# Patient Record
Sex: MALE | Race: WHITE | NOT HISPANIC OR LATINO | Employment: FULL TIME | URBAN - METROPOLITAN AREA
[De-identification: names, ages, dates, MRNs, and addresses within clinical notes are randomized per-mention and may not be internally consistent; named-entity substitution may affect disease eponyms.]

---

## 2017-05-24 ENCOUNTER — OFFICE VISIT (OUTPATIENT)
Dept: URGENT CARE | Facility: PHYSICIAN GROUP | Age: 57
End: 2017-05-24
Payer: COMMERCIAL

## 2017-05-24 VITALS
SYSTOLIC BLOOD PRESSURE: 130 MMHG | WEIGHT: 230 LBS | TEMPERATURE: 97.5 F | HEART RATE: 88 BPM | BODY MASS INDEX: 34.07 KG/M2 | DIASTOLIC BLOOD PRESSURE: 78 MMHG | OXYGEN SATURATION: 97 % | HEIGHT: 69 IN

## 2017-05-24 DIAGNOSIS — J01.00 ACUTE NON-RECURRENT MAXILLARY SINUSITIS: ICD-10-CM

## 2017-05-24 LAB
INT CON NEG: NEGATIVE
INT CON POS: POSITIVE
S PYO AG THROAT QL: NORMAL

## 2017-05-24 PROCEDURE — 87880 STREP A ASSAY W/OPTIC: CPT | Performed by: FAMILY MEDICINE

## 2017-05-24 PROCEDURE — 99214 OFFICE O/P EST MOD 30 MIN: CPT | Performed by: FAMILY MEDICINE

## 2017-05-24 RX ORDER — AMOXICILLIN AND CLAVULANATE POTASSIUM 562.5; 437.5; 62.5 MG/1; MG/1; MG/1
2 TABLET, MULTILAYER, EXTENDED RELEASE ORAL 2 TIMES DAILY
Qty: 28 TAB | Refills: 0 | Status: SHIPPED | OUTPATIENT
Start: 2017-05-24 | End: 2017-05-24 | Stop reason: CLARIF

## 2017-05-24 RX ORDER — MOMETASONE FUROATE 50 UG/1
2 SPRAY, METERED NASAL DAILY
Qty: 1 INHALER | Refills: 0 | Status: SHIPPED | OUTPATIENT
Start: 2017-05-24

## 2017-05-24 RX ORDER — AMOXICILLIN AND CLAVULANATE POTASSIUM 562.5; 437.5; 62.5 MG/1; MG/1; MG/1
2 TABLET, MULTILAYER, EXTENDED RELEASE ORAL 2 TIMES DAILY
Qty: 28 TAB | Refills: 0 | Status: SHIPPED | OUTPATIENT
Start: 2017-05-24 | End: 2017-05-31

## 2017-05-24 RX ORDER — DEXTROAMPHETAMINE SACCHARATE, AMPHETAMINE ASPARTATE MONOHYDRATE, DEXTROAMPHETAMINE SULFATE AND AMPHETAMINE SULFATE 2.5; 2.5; 2.5; 2.5 MG/1; MG/1; MG/1; MG/1
10 CAPSULE, EXTENDED RELEASE ORAL EVERY MORNING
COMMUNITY

## 2017-05-24 NOTE — MR AVS SNAPSHOT
"        Jemal Dennis   2017 8:25 AM   Office Visit   MRN: [id number]    Department:  [facility]   Dept Phone:  [phone]    Description:  Male : 1960   Provider:  Mika Nair M.D.           Reason for Visit     Pharyngitis x 3 days, x 1 week cough       Allergies as of 2017     No Known Allergies      You were diagnosed with     Acute non-recurrent maxillary sinusitis   [[id number]]         Vital Signs     Blood Pressure Pulse Temperature Height Weight Body Mass Index    130/78 mmHg 88 36.4 °C (97.5 °F) 1.753 m (5' 9\") 104.327 kg (230 lb) 33.95 kg/m2    Oxygen Saturation Smoking Status                97% Never Smoker           Basic Information     Date Of Birth Sex Race Ethnicity Preferred Language    1960 Male White Non- English      Health Maintenance        Date Due Completion Dates    IMM DTaP/Tdap/Td Vaccine (1 - Tdap) 1979 ---    COLONOSCOPY 2010 ---            Results     POCT Rapid Strep A      Component    Rapid Strep Screen    NEG    Internal Control Positive    Positive    Internal Control Negative    Negative                        Current Immunizations     No immunizations on file.      Below and/or attached are the medications your provider expects you to take. Review all of your home medications and newly ordered medications with your provider and/or pharmacist. Follow medication instructions as directed by your provider and/or pharmacist. Please keep your medication list with you and share with your provider. Update the information when medications are discontinued, doses are changed, or new medications (including over-the-counter products) are added; and carry medication information at all times in the event of emergency situations     Allergies:  No Known Allergies          Medications  Valid as of: May 24, 2017 -  6:06 PM    Generic Name Brand Name Tablet Size Instructions for use    Albuterol Sulfate (Aero Soln) VENTOLIN  (90 " BASE) MCG/ACT Inhale 2 Puffs by mouth every 6 hours as needed for Shortness of Breath. Please include metered dose inhaler chamber/spacer device and illustrate useage        Amoxicillin-Pot Clavulanate (TABLET SR 12 HR) AUGMENTIN SR 1000-62.5 MG Take 2 Tabs by mouth 2 times a day for 7 days.        Amphetamine-Dextroamphetamine (CAPSULE SR 24 HR) ADDERALL XR 10 MG Take 10 mg by mouth every morning.        Hydrocod Polst-Chlorphen Polst (Liquid CR) TUSSIONEX 10-8 MG/5ML Take 5 mL by mouth every 12 hours as needed for Cough or Rhinitis. No driving or operation of machinery after taking this medication. Do not exceed 10 mL in a 24-hour period.        Mometasone Furoate (Suspension) NASONEX 50 MCG/ACT Spray 2 Sprays in nose every day.        Mometasone Furoate (Suspension) NASONEX 50 MCG/ACT Spray 2 Sprays in nose every day.        Pseudoephedrine HCl   Take  by mouth.        Rivaroxaban   Take  by mouth.        .                 Medicines prescribed today were sent to:     [id number] - [place], [place] - [id number] [facility] [address]    Phone: [phone] Fax: [phone]    Open 24 Hours?: No      Medication refill instructions:       If your prescription bottle indicates you have medication refills left, it is not necessary to call your provider’s office. Please contact your pharmacy and they will refill your medication.    If your prescription bottle indicates you do not have any refills left, you may request refills at any time through one of the following ways: The online combionic system (except Urgent Care), by calling your provider’s office, or by asking your pharmacy to contact your provider’s office with a refill request. Medication refills are processed only during regular business hours and may not be available until the next business day. Your provider may request additional information or to have a follow-up visit with you prior to refilling your medication.   *Please Note:  Medication refills are assigned a new Rx number when refilled electronically. Your pharmacy may indicate that no refills were authorized even though a new prescription for the same medication is available at the pharmacy. Please request the medicine by name with the pharmacy before contacting your provider for a refill.           SeeClickFix Access Code: [email or web address]  Expires: 6/23/2017  8:27 AM    SeeClickFix  A secure, online tool to manage your health information     [employer]’s SeeClickFix® is a secure, online tool that connects you to your personalized health information from the privacy of your home -- day or night - making it very easy for you to manage your healthcare. Once the activation process is completed, you can even access your medical information using the SeeClickFix ag, which is available for free in the Apple Ag store or Google Play store.     SeeClickFix provides the following levels of access (as shown below):   My Chart Features   [facility] Doctor [facility]  Specialists [facility]  [facility]  Primary Care  Doctor   Email your healthcare team securely and privately 24/7 X X X    Manage appointments: schedule your next appointment; view details of past/upcoming appointments X      Request prescription refills. X      View recent personal medical records, including lab and immunizations X X X X   View health record, including health history, allergies, medications X X X X   Read reports about your outpatient visits, procedures, consult and ER notes X X X X   See your discharge summary, which is a recap of your hospital and/or ER visit that includes your diagnosis, lab results, and care plan. X X       How to register for SeeClickFix:  1. Go to  [email or web address].  2. Click on the Sign Up Now box, which takes you to the New Member Sign Up page. You will need to provide the following information:  a. Enter your SeeClickFix Access Code exactly as it appears at the top of this page. (You will  not need to use this code after you’ve completed the sign-up process. If you do not sign up before the expiration date, you must request a new code.)   b. Enter your date of birth.   c. Enter your home email address.   d. Click Submit, and follow the next screen’s instructions.  3. Create a deltaDNAt ID. This will be your deltaDNAt login ID and cannot be changed, so think of one that is secure and easy to remember.  4. Create a [employer] password. You can change your password at any time.  5. Enter your Password Reset Question and Answer. This can be used at a later time if you forget your password.   6. Enter your e-mail address. This allows you to receive e-mail notifications when new information is available in [employer].  7. Click Sign Up. You can now view your health information.    For assistance activating your [employer] account, call [phone]

## 2018-12-04 ENCOUNTER — OFFICE VISIT (OUTPATIENT)
Dept: URGENT CARE | Facility: PHYSICIAN GROUP | Age: 58
End: 2018-12-04
Payer: COMMERCIAL

## 2018-12-04 VITALS
OXYGEN SATURATION: 96 % | SYSTOLIC BLOOD PRESSURE: 144 MMHG | WEIGHT: 228 LBS | BODY MASS INDEX: 33.77 KG/M2 | TEMPERATURE: 98.2 F | RESPIRATION RATE: 16 BRPM | HEIGHT: 69 IN | DIASTOLIC BLOOD PRESSURE: 90 MMHG | HEART RATE: 91 BPM

## 2018-12-04 DIAGNOSIS — R25.2 SPASM: ICD-10-CM

## 2018-12-04 DIAGNOSIS — S39.012A STRAIN OF LUMBAR REGION, INITIAL ENCOUNTER: ICD-10-CM

## 2018-12-04 PROCEDURE — 99214 OFFICE O/P EST MOD 30 MIN: CPT | Performed by: FAMILY MEDICINE

## 2018-12-04 RX ORDER — CYCLOBENZAPRINE HCL 10 MG
10 TABLET ORAL 3 TIMES DAILY PRN
Qty: 20 TAB | Refills: 0 | Status: SHIPPED | OUTPATIENT
Start: 2018-12-04

## 2018-12-04 ASSESSMENT — ENCOUNTER SYMPTOMS
SENSORY CHANGE: 0
NECK PAIN: 0
EYE DISCHARGE: 0
FOCAL WEAKNESS: 0
FLANK PAIN: 0
WEIGHT LOSS: 0
EYE REDNESS: 0
MYALGIAS: 0

## 2018-12-04 NOTE — PROGRESS NOTES
"Subjective:      Jemal Dennis is a 57 y.o. male who presents with Low Back Pain (x3days)            3 days low back pain probably triggered by pulling dog into shower to bathe her. Pain is worse when standing from seated position and walking. 8/10. No radiation. No myelopathy. No PMH cancer or unwanted weight loss. No trauma. OTC tylenol without much relief. No other aggravating or alleviating factors.          Review of Systems   Constitutional: Negative for malaise/fatigue and weight loss.   Eyes: Negative for discharge and redness.   Genitourinary: Negative for flank pain and hematuria.   Musculoskeletal: Negative for joint pain, myalgias and neck pain.   Skin: Negative for itching and rash.   Neurological: Negative for sensory change and focal weakness.   .  Medications, Allergies, and current problem list reviewed today in Epic         Objective:     /90   Pulse 91   Temp 36.8 °C (98.2 °F) (Temporal)   Resp 16   Ht 1.753 m (5' 9\")   Wt 103.4 kg (228 lb)   SpO2 96%   BMI 33.67 kg/m²      Physical Exam   Constitutional: He is oriented to person, place, and time. He appears well-developed and well-nourished. No distress.   HENT:   Head: Normocephalic and atraumatic.   Eyes: Conjunctivae are normal.   Neck: Neck supple.   Musculoskeletal:        Back:    Lymphadenopathy:     He has no cervical adenopathy.   Neurological: He is alert and oriented to person, place, and time. He displays normal reflexes.   Bilateral lower extremity strength and sensory intact.  Negative straight leg raise.     Skin: Skin is warm and dry. No rash noted.               Assessment/Plan:     1. Strain of lumbar region, initial encounter     2. Spasm  cyclobenzaprine (FLEXERIL) 10 MG Tab     Differential diagnosis, natural history, supportive care, and indications for immediate follow-up discussed at length.     "

## 2018-12-04 NOTE — LETTER
December 4, 2018         Patient: Jemal Dennis   YOB: 1960   Date of Visit: 12/4/2018           To Whom it May Concern:    Jemal Dennis was seen in my clinic on 12/4/2018. Please excuse 12/3 through 12/6/2018. May return to next scheduled shift full duty without restrictions if symptoms have resolved.       Sincerely,           Bacilio Bedoya M.D.  Electronically Signed

## 2019-11-29 ENCOUNTER — OFFICE VISIT (OUTPATIENT)
Dept: URGENT CARE | Facility: PHYSICIAN GROUP | Age: 59
End: 2019-11-29
Payer: COMMERCIAL

## 2019-11-29 VITALS
TEMPERATURE: 97.4 F | BODY MASS INDEX: 28.88 KG/M2 | HEART RATE: 98 BPM | OXYGEN SATURATION: 91 % | DIASTOLIC BLOOD PRESSURE: 70 MMHG | SYSTOLIC BLOOD PRESSURE: 122 MMHG | WEIGHT: 195 LBS | HEIGHT: 69 IN

## 2019-11-29 DIAGNOSIS — J01.40 ACUTE PANSINUSITIS, RECURRENCE NOT SPECIFIED: ICD-10-CM

## 2019-11-29 PROCEDURE — 99214 OFFICE O/P EST MOD 30 MIN: CPT | Performed by: PHYSICIAN ASSISTANT

## 2019-11-29 RX ORDER — AMOXICILLIN AND CLAVULANATE POTASSIUM 875; 125 MG/1; MG/1
1 TABLET, FILM COATED ORAL 2 TIMES DAILY
Qty: 10 TAB | Refills: 0 | Status: SHIPPED | OUTPATIENT
Start: 2019-11-29 | End: 2019-12-04

## 2019-11-29 RX ORDER — FLUTICASONE PROPIONATE 50 MCG
1 SPRAY, SUSPENSION (ML) NASAL DAILY
Qty: 1 BOTTLE | Refills: 0 | Status: SHIPPED | OUTPATIENT
Start: 2019-11-29

## 2019-11-29 ASSESSMENT — ENCOUNTER SYMPTOMS
MYALGIAS: 0
EYE REDNESS: 0
EYE DISCHARGE: 0
SINUS PRESSURE: 1
CHILLS: 0
DIARRHEA: 0
SORE THROAT: 1
CONSTIPATION: 0
SPUTUM PRODUCTION: 0
FEVER: 0
ABDOMINAL PAIN: 0
SHORTNESS OF BREATH: 0
SINUS PAIN: 1
NAUSEA: 0
DIZZINESS: 0
VOMITING: 0
HEADACHES: 1
WHEEZING: 0
PALPITATIONS: 0
COUGH: 1

## 2019-11-29 NOTE — PROGRESS NOTES
Subjective:      Jemal Dennis is a 58 y.o. male who presents with Cough (Ears muffled ) and Congestion      Sinusitis   This is a new problem. The current episode started 1 to 4 weeks ago (Symptoms started almost 2 weeks ago). The problem is unchanged. There has been no fever. The pain is moderate. Associated symptoms include congestion, coughing, ear pain, headaches, sinus pressure, sneezing and a sore throat. Pertinent negatives include no chills or shortness of breath. Past treatments include oral decongestants, saline sprays and acetaminophen. The treatment provided mild relief.       Review of Systems   Constitutional: Positive for malaise/fatigue. Negative for chills and fever.   HENT: Positive for congestion, ear pain, sinus pressure, sinus pain, sneezing and sore throat.    Eyes: Negative for discharge and redness.   Respiratory: Positive for cough. Negative for sputum production, shortness of breath and wheezing.    Cardiovascular: Negative for chest pain and palpitations.   Gastrointestinal: Negative for abdominal pain, constipation, diarrhea, nausea and vomiting.   Musculoskeletal: Negative for myalgias.   Skin: Negative for rash.   Neurological: Positive for headaches. Negative for dizziness.       PMH:  has no past medical history on file.  MEDS:   Current Outpatient Medications:   •  amoxicillin-clavulanate (AUGMENTIN) 875-125 MG Tab, Take 1 Tab by mouth 2 times a day for 5 days., Disp: 10 Tab, Rfl: 0  •  fluticasone (FLONASE) 50 MCG/ACT nasal spray, Spray 1 Spray in nose every day., Disp: 1 Bottle, Rfl: 0  •  Rivaroxaban (XARELTO PO), Take  by mouth., Disp: , Rfl:   •  cyclobenzaprine (FLEXERIL) 10 MG Tab, Take 1 Tab by mouth 3 times a day as needed for Muscle Spasms. (Patient not taking: Reported on 11/29/2019), Disp: 20 Tab, Rfl: 0  •  amphetamine-dextroamphetamine XR (ADDERALL XR, 10MG,) 10 MG CAPSULE SR 24 HR, Take 10 mg by mouth every morning., Disp: , Rfl:   •  mometasone (NASONEX) 50  "MCG/ACT nasal spray, Spray 2 Sprays in nose every day. (Patient not taking: Reported on 12/4/2018), Disp: 1 Inhaler, Rfl: 0  •  mometasone (NASONEX) 50 MCG/ACT nasal spray, Spray 2 Sprays in nose every day. (Patient not taking: Reported on 11/29/2019), Disp: 1 Inhaler, Rfl: 0  •  Pseudoephedrine HCl (DECONGESTANT PO), Take  by mouth., Disp: , Rfl:   •  VENTOLIN  (90 BASE) MCG/ACT AERS, Inhale 2 Puffs by mouth every 6 hours as needed for Shortness of Breath. Please include metered dose inhaler chamber/spacer device and illustrate useage (Patient not taking: Reported on 11/29/2019), Disp: 1 Inhaler, Rfl: 0  •  hydrocod polst-chlorphen polst (TUSSIONEX PENNKINETIC ER) 10-8 MG/5ML LQCR, Take 5 mL by mouth every 12 hours as needed for Cough or Rhinitis. No driving or operation of machinery after taking this medication. Do not exceed 10 mL in a 24-hour period. (Patient not taking: Reported on 11/29/2019), Disp: 75 mL, Rfl: 0  ALLERGIES: No Known Allergies  SURGHX: History reviewed. No pertinent surgical history.  SOCHX:  reports that he has never smoked. He has never used smokeless tobacco. He reports that he does not drink alcohol.  FH: Family history was reviewed, no pertinent findings to report     Objective:     /70 (BP Location: Left arm, Patient Position: Sitting)   Pulse 98   Temp 36.3 °C (97.4 °F)   Ht 1.753 m (5' 9\")   Wt 88.5 kg (195 lb)   SpO2 91%   BMI 28.80 kg/m²      Physical Exam  Constitutional:       Appearance: He is well-developed.   HENT:      Head: Normocephalic and atraumatic.      Right Ear: Ear canal and external ear normal. A middle ear effusion is present.      Left Ear: External ear normal. A middle ear effusion is present.      Nose: Mucosal edema and congestion present.      Right Sinus: Maxillary sinus tenderness and frontal sinus tenderness present.      Left Sinus: Maxillary sinus tenderness and frontal sinus tenderness present.      Mouth/Throat:      Lips: Pink.      " Mouth: Mucous membranes are moist.      Pharynx: Oropharynx is clear.   Eyes:      Conjunctiva/sclera: Conjunctivae normal.      Pupils: Pupils are equal, round, and reactive to light.   Neck:      Musculoskeletal: Normal range of motion.   Cardiovascular:      Rate and Rhythm: Normal rate and regular rhythm.      Heart sounds: Normal heart sounds. No murmur.   Pulmonary:      Effort: Pulmonary effort is normal.      Breath sounds: Normal breath sounds. No wheezing.   Lymphadenopathy:      Cervical: No cervical adenopathy.   Skin:     General: Skin is warm and dry.      Capillary Refill: Capillary refill takes less than 2 seconds.   Neurological:      Mental Status: He is alert and oriented to person, place, and time.   Psychiatric:         Behavior: Behavior normal.         Judgment: Judgment normal.            Assessment/Plan:       1. Acute pansinusitis, recurrence not specified  - amoxicillin-clavulanate (AUGMENTIN) 875-125 MG Tab; Take 1 Tab by mouth 2 times a day for 5 days.  Dispense: 10 Tab; Refill: 0  - fluticasone (FLONASE) 50 MCG/ACT nasal spray; Spray 1 Spray in nose every day.  Dispense: 1 Bottle; Refill: 0          Differential Diagnosis, natural history, and supportive care discussed. Return to the Urgent Care or follow up with your PCP if symptoms fail to resolve, or for any new or worsening symptoms. Emergency room precautions discussed. Patient and/or family appears understanding of information.

## 2020-06-12 ENCOUNTER — OFFICE VISIT (OUTPATIENT)
Dept: URGENT CARE | Facility: PHYSICIAN GROUP | Age: 60
End: 2020-06-12
Payer: COMMERCIAL

## 2020-06-12 VITALS
TEMPERATURE: 97.2 F | SYSTOLIC BLOOD PRESSURE: 120 MMHG | RESPIRATION RATE: 16 BRPM | WEIGHT: 205 LBS | HEIGHT: 69 IN | BODY MASS INDEX: 30.36 KG/M2 | OXYGEN SATURATION: 98 % | DIASTOLIC BLOOD PRESSURE: 84 MMHG | HEART RATE: 72 BPM

## 2020-06-12 DIAGNOSIS — S05.90XA EYE INJURY, UNSPECIFIED LATERALITY, INITIAL ENCOUNTER: ICD-10-CM

## 2020-06-12 PROCEDURE — 99213 OFFICE O/P EST LOW 20 MIN: CPT | Performed by: NURSE PRACTITIONER

## 2020-06-12 SDOH — HEALTH STABILITY: MENTAL HEALTH: HOW OFTEN DO YOU HAVE A DRINK CONTAINING ALCOHOL?: MONTHLY OR LESS

## 2020-06-12 ASSESSMENT — ENCOUNTER SYMPTOMS
FEVER: 0
CHILLS: 0

## 2020-06-12 NOTE — PROGRESS NOTES
Subjective:      Jemal Dennis is a 59 y.o. male who presents with Foreign Body in Eye (L EYE,  poss metal in eye x 1day)    History reviewed. No pertinent past medical history.  Social History     Socioeconomic History   • Marital status: Single     Spouse name: Not on file   • Number of children: Not on file   • Years of education: Not on file   • Highest education level: Not on file   Occupational History   • Not on file   Social Needs   • Financial resource strain: Not on file   • Food insecurity     Worry: Not on file     Inability: Not on file   • Transportation needs     Medical: Not on file     Non-medical: Not on file   Tobacco Use   • Smoking status: Never Smoker   • Smokeless tobacco: Never Used   Substance and Sexual Activity   • Alcohol use: Yes     Frequency: Monthly or less   • Drug use: Not on file   • Sexual activity: Not on file   Lifestyle   • Physical activity     Days per week: Not on file     Minutes per session: Not on file   • Stress: Not on file   Relationships   • Social connections     Talks on phone: Not on file     Gets together: Not on file     Attends Sikh service: Not on file     Active member of club or organization: Not on file     Attends meetings of clubs or organizations: Not on file     Relationship status: Not on file   • Intimate partner violence     Fear of current or ex partner: Not on file     Emotionally abused: Not on file     Physically abused: Not on file     Forced sexual activity: Not on file   Other Topics Concern   • Not on file   Social History Narrative   • Not on file     History reviewed. No pertinent family history.    Allergies: Patient has no known allergies.    Patient is a 59-year-old male who presents today with complaint of pain and foreign body sensation to the left eye.  Patient states he was grinding metal yesterday at home while working on a truck and got a piece of metal in his eye.  He attempted to flush several times without success.   "States he continues to have discomfort.  Denies any light sensitivity, or decreased vision.  No headache or nausea.        Other   This is a new problem. The current episode started yesterday. The problem occurs constantly. The problem has been unchanged. Pertinent negatives include no chills or fever. Nothing aggravates the symptoms. He has tried nothing for the symptoms. The treatment provided no relief.       Review of Systems   Constitutional: Negative for chills and fever.   Eyes:        Left eye pain   All other systems reviewed and are negative.         Objective:     /84   Pulse 72   Temp 36.2 °C (97.2 °F) (Temporal)   Resp 16   Ht 1.753 m (5' 9\")   Wt 93 kg (205 lb)   SpO2 98%   BMI 30.27 kg/m²      Physical Exam  Vitals signs reviewed.   Constitutional:       Appearance: Normal appearance.   Neurological:      Mental Status: He is alert.       Left eye was anesthetized with proparacaine and then stained with fluorescein.  On exam with Hoover lamp, there appears to be a very subtle lucency at the 6 o'clock position.  I do not find this area with using a plain light.  I do not see an obvious rust ring at this time.  No obvious foreign object under the upper or lower lid.  No other abrasions or stain uptake.  Due to the fact that patient felt something go into his eye and was working with metal, I am concerned for the possibility of an embedded or penetrating foreign object.  I discussed this with the patient and advised him that I would like for him to be seen by specialist today as I believe he needs a higher level of care than I can reasonably and safely provide here in the urgent care.  Patient understands and verbalized agreement.  We did contact [facility] in [place] and they will see the patient at 1245 today.  Referral was placed for insurance purposes            Assessment/Plan:       1. Eye injury, unspecified laterality, initial encounter  2. Penetrating FB to the left " eye    Referral to optomettry  Patient will see [facility] today

## 2022-04-30 ENCOUNTER — OFFICE VISIT (OUTPATIENT)
Dept: URGENT CARE | Facility: PHYSICIAN GROUP | Age: 62
End: 2022-04-30
Payer: COMMERCIAL

## 2022-04-30 VITALS
HEIGHT: 69 IN | BODY MASS INDEX: 32.88 KG/M2 | TEMPERATURE: 98.2 F | WEIGHT: 222 LBS | RESPIRATION RATE: 16 BRPM | HEART RATE: 83 BPM | DIASTOLIC BLOOD PRESSURE: 82 MMHG | OXYGEN SATURATION: 96 % | SYSTOLIC BLOOD PRESSURE: 122 MMHG

## 2022-04-30 DIAGNOSIS — J02.9 SORE THROAT: ICD-10-CM

## 2022-04-30 DIAGNOSIS — J06.9 UPPER RESPIRATORY TRACT INFECTION, UNSPECIFIED TYPE: ICD-10-CM

## 2022-04-30 DIAGNOSIS — R05.9 COUGH: ICD-10-CM

## 2022-04-30 LAB
INT CON NEG: NORMAL
INT CON POS: NORMAL
S PYO AG THROAT QL: NEGATIVE

## 2022-04-30 PROCEDURE — 99213 OFFICE O/P EST LOW 20 MIN: CPT | Performed by: PHYSICIAN ASSISTANT

## 2022-04-30 PROCEDURE — 87880 STREP A ASSAY W/OPTIC: CPT | Performed by: PHYSICIAN ASSISTANT

## 2022-04-30 RX ORDER — RIVAROXABAN 20 MG/1
20 TABLET, FILM COATED ORAL EVERY EVENING
COMMUNITY
Start: 2022-04-01

## 2022-05-01 NOTE — PROGRESS NOTES
"Subjective:   Jemal Dennis is a 61 y.o. male who presents for Laryngitis, Pharyngitis (/), and Cough  Patient presents with chief complaint of mild sore throat and cough associated with nasal congestion.  Granddaughter had croup, son also sick.  Started with a scratchy throat Wednesday, mild.  Awoke the next morning with cough.  Progressed.  No fever.  Did feel mild chill.  No SOB.  Nasal congestion.  No difficulty swallowing.  Nyquil and dayquil have been helpful.  Does use Flonase and decongestant as needed.    Medications:  amphetamine-dextroamphetamine XR Cp24  cyclobenzaprine Tabs  DECONGESTANT PO  fluticasone  hydrocod polst-chlorphen polst Lqcr  mometasone  Ventolin HFA Aers  XARELTO PO  Xarelto Tabs    Allergies:             Patient has no known allergies.    Surgical History:       No past surgical history on file.    Past Social Hx:  Jemal Dennis  reports that he has never smoked. He has never used smokeless tobacco. He reports current alcohol use.     Past Family Hx:   Jemal Dennis family history is not on file.       Problem list, medications, and allergies reviewed by myself today in Epic.     Objective:     /82   Pulse 83   Temp 36.8 °C (98.2 °F) (Temporal)   Resp 16   Ht 1.753 m (5' 9\")   Wt 101 kg (222 lb)   SpO2 96%   BMI 32.78 kg/m²     Physical Exam  Vitals reviewed.   Constitutional:       General: He is not in acute distress.     Appearance: He is well-developed. He is not diaphoretic.   HENT:      Head: Normocephalic.      Mouth/Throat:      Pharynx: Posterior oropharyngeal erythema present. No oropharyngeal exudate.      Tonsils: No tonsillar exudate.   Eyes:      Conjunctiva/sclera: Conjunctivae normal.      Pupils: Pupils are equal, round, and reactive to light.   Cardiovascular:      Rate and Rhythm: Normal rate and regular rhythm.      Pulses: Normal pulses.      Heart sounds: Normal heart sounds. No murmur heard.  Pulmonary:      Effort: No respiratory " distress.      Breath sounds: Normal breath sounds. No stridor. No wheezing or rhonchi.   Musculoskeletal:         General: Normal range of motion.      Cervical back: Normal range of motion and neck supple.   Skin:     General: Skin is warm and dry.   Neurological:      Mental Status: He is alert and oriented to person, place, and time.   Psychiatric:         Behavior: Behavior is cooperative.       Rapid strep negative  Assessment/Plan:     Diagnosis and Associated Orders:     1. Sore throat  - POCT Rapid Strep A    2. Cough    3. Upper respiratory tract infection, unspecified type            Comments/MDM:    The patient's presenting symptoms and exam findings most likely are due to a viral etiology.     Symptomatic and supportive care:   Plenty of oral hydration and rest   Over the counter cough suppressant as directed.  Tylenol or ibuprofen for pain and fever as directed.   Warm salt water gargles for sore throat, soft foods, cool liquids.   Saline nasal spray, Flonase, and/or otc sudafed (if no history of hypertension) as a decongestant.   Infection control measures at home. Stay away from people, Hand washing, covering sneeze/cough, disinfect surfaces.   Remain home from work, school, and other populated environments while ill.  Overall, the patient is well-appearing. They are not hypoxic, afebrile, and a normal pulmonary exam.      I personally reviewed prior external notes and test results pertinent to today's visit.  Red flags discussed as well as indications to present to the Emergency Department.  Supportive care, natural history, differential diagnoses, and indications for immediate follow-up discussed.  Patient expresses understanding and agrees to plan.  Patient denies any other questions or concerns.    Follow-up with the primary care physician for recheck, reevaluation, and consideration of further management.      Please note that this dictation was created using voice recognition software. I have  made a reasonable attempt to correct obvious errors, but I expect that there are errors of grammar and possibly content that I did not discover before finalizing the note.    This note was electronically signed by Jewels Martinez PA-C

## 2024-10-30 ENCOUNTER — OFFICE VISIT (OUTPATIENT)
Dept: URGENT CARE | Facility: PHYSICIAN GROUP | Age: 64
End: 2024-10-30
Payer: COMMERCIAL

## 2024-10-30 VITALS
TEMPERATURE: 97.2 F | HEART RATE: 63 BPM | BODY MASS INDEX: 33.08 KG/M2 | DIASTOLIC BLOOD PRESSURE: 76 MMHG | OXYGEN SATURATION: 95 % | WEIGHT: 218.3 LBS | HEIGHT: 68 IN | SYSTOLIC BLOOD PRESSURE: 122 MMHG | RESPIRATION RATE: 20 BRPM

## 2024-10-30 DIAGNOSIS — Z79.01 CHRONIC ANTICOAGULATION: ICD-10-CM

## 2024-10-30 DIAGNOSIS — R42 DIZZINESS: ICD-10-CM

## 2024-10-30 DIAGNOSIS — R29.898 WEAKNESS OF BOTH LOWER EXTREMITIES: ICD-10-CM

## 2024-10-30 ASSESSMENT — ENCOUNTER SYMPTOMS
WEAKNESS: 1
COUGH: 0
CHILLS: 0
DIZZINESS: 1
NAUSEA: 1
PALPITATIONS: 0
SHORTNESS OF BREATH: 0
WHEEZING: 0
FEVER: 0